# Patient Record
Sex: MALE | Race: WHITE | NOT HISPANIC OR LATINO | Employment: UNEMPLOYED | URBAN - NONMETROPOLITAN AREA
[De-identification: names, ages, dates, MRNs, and addresses within clinical notes are randomized per-mention and may not be internally consistent; named-entity substitution may affect disease eponyms.]

---
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
## 2020-01-15 ENCOUNTER — APPOINTMENT (OUTPATIENT)
Dept: GENERAL RADIOLOGY | Facility: HOSPITAL | Age: 20
End: 2020-01-15

## 2020-01-15 ENCOUNTER — HOSPITAL ENCOUNTER (EMERGENCY)
Facility: HOSPITAL | Age: 20
Discharge: HOME | End: 2020-01-15
Attending: EMERGENCY MEDICINE | Admitting: EMERGENCY MEDICINE

## 2020-01-15 VITALS
HEART RATE: 92 BPM | RESPIRATION RATE: 24 BRPM | TEMPERATURE: 98.9 F | OXYGEN SATURATION: 95 % | SYSTOLIC BLOOD PRESSURE: 135 MMHG | WEIGHT: 315 LBS | DIASTOLIC BLOOD PRESSURE: 72 MMHG | BODY MASS INDEX: 47.74 KG/M2 | HEIGHT: 68 IN

## 2020-01-15 DIAGNOSIS — K20.90 ESOPHAGITIS: ICD-10-CM

## 2020-01-15 DIAGNOSIS — J02.9 PHARYNGITIS, UNSPECIFIED ETIOLOGY: Primary | ICD-10-CM

## 2020-01-15 LAB — S PYO AG THROAT QL: NEGATIVE

## 2020-01-15 PROCEDURE — 99283 EMERGENCY DEPT VISIT LOW MDM: CPT

## 2020-01-15 PROCEDURE — 87081 CULTURE SCREEN ONLY: CPT | Performed by: EMERGENCY MEDICINE

## 2020-01-15 PROCEDURE — 71045 X-RAY EXAM CHEST 1 VIEW: CPT

## 2020-01-15 PROCEDURE — 87880 STREP A ASSAY W/OPTIC: CPT | Performed by: EMERGENCY MEDICINE

## 2020-01-15 RX ORDER — ALUMINA, MAGNESIA, AND SIMETHICONE 2400; 2400; 240 MG/30ML; MG/30ML; MG/30ML
15 SUSPENSION ORAL ONCE
Status: COMPLETED | OUTPATIENT
Start: 2020-01-15 | End: 2020-01-15

## 2020-01-15 RX ORDER — RANITIDINE 150 MG/1
150 TABLET ORAL 2 TIMES DAILY
Qty: 60 TABLET | Refills: 0 | Status: SHIPPED | OUTPATIENT
Start: 2020-01-15 | End: 2020-02-03

## 2020-01-15 RX ORDER — LIDOCAINE HYDROCHLORIDE 20 MG/ML
15 SOLUTION OROPHARYNGEAL ONCE
Status: COMPLETED | OUTPATIENT
Start: 2020-01-15 | End: 2020-01-15

## 2020-01-15 RX ADMIN — LIDOCAINE HYDROCHLORIDE 15 ML: 20 SOLUTION ORAL; TOPICAL at 13:33

## 2020-01-15 RX ADMIN — ALUMINUM HYDROXIDE, MAGNESIUM HYDROXIDE, AND DIMETHICONE 15 ML: 400; 400; 40 SUSPENSION ORAL at 13:34

## 2020-01-15 NOTE — ED PROVIDER NOTES
"Subjective   Abhishek Jha is a 19 year old male complaining of a sore throat. The patient reports that for the past week, he has been experiencing a pain in his throat that worsens with swallowing. He states that he has been eating less than normal for the past three days as a result and that he has not had a bowel movement in a several days. However, he notes that his constipation began prior to his decreased food intake and he is still passing gas and denies abdominal pain. He also reports that he has been experiencing a sharp pain in the center of his chest which worsens with a deep breathing and eating / drinking. He has had a mild headache as well. He presently denies any SOB, dizziness, cough, abdominal pain, rhinorrhea, or fever. He denies any known exposure to illness. He states that he has been able to drink fluids and swallow Ibuprofen and that this does seem to relieve some of his pain. The patient denies a medical history of GERD but does smoke cigarettes. He admits to \"eating too much at a family reunion\" prior to onset of symptoms. There are no other acute complaints at this time.      History provided by:  Patient  Sore Throat   Location:  Generalized  Severity:  Moderate  Duration:  1 week  Timing:  Constant  Progression:  Unchanged  Chronicity:  New  Relieved by: Cold liquids and Ibuprofen.  Worsened by:  Eating and swallowing  Associated symptoms: chest pain and headaches    Associated symptoms: no abdominal pain, no adenopathy, no chills, no cough, no drooling, no ear pain, no fever, no neck stiffness, no rash, no rhinorrhea, no shortness of breath, no trouble swallowing and no voice change    Risk factors: no exposure to strep, no exposure to mono and no sick contacts        Review of Systems   Constitutional: Negative for chills and fever.   HENT: Positive for sore throat. Negative for congestion, dental problem, drooling, ear pain, facial swelling, mouth sores, rhinorrhea, trouble swallowing " and voice change.    Respiratory: Negative for cough and shortness of breath.    Cardiovascular: Positive for chest pain.   Gastrointestinal: Positive for constipation. Negative for abdominal pain.   Musculoskeletal: Negative for neck pain and neck stiffness.   Skin: Negative for rash.   Neurological: Positive for headaches. Negative for dizziness and weakness.   Hematological: Negative for adenopathy.   Psychiatric/Behavioral: Negative for confusion.   All other systems reviewed and are negative.      History reviewed. No pertinent past medical history.    Allergies   Allergen Reactions   • Penicillins        History reviewed. No pertinent surgical history.    History reviewed. No pertinent family history.    Social History     Socioeconomic History   • Marital status: Single     Spouse name: Not on file   • Number of children: Not on file   • Years of education: Not on file   • Highest education level: Not on file   Tobacco Use   • Smoking status: Never Smoker   Substance and Sexual Activity   • Alcohol use: Never     Frequency: Never   • Drug use: Never   • Sexual activity: Defer         Objective   Physical Exam   Constitutional: He is oriented to person, place, and time. He appears well-developed and well-nourished.   HENT:   Head: Normocephalic and atraumatic.   Nose: Nose normal.   Mouth/Throat: Posterior oropharyngeal erythema (Mild.) present. No oropharyngeal exudate or posterior oropharyngeal edema.   Airway intact.   Eyes: Conjunctivae are normal. No scleral icterus.   Neck: Normal range of motion. Neck supple.   Cardiovascular: Normal rate and regular rhythm.   No murmur heard.  Pulmonary/Chest: Effort normal and breath sounds normal. No respiratory distress. He exhibits tenderness (mild inferior mid sternal).   Lung exam normal.   Abdominal: Soft. There is no tenderness.   Musculoskeletal: Normal range of motion.   Neurological: He is alert and oriented to person, place, and time.   Skin: Skin is warm  "and dry.   Psychiatric: He has a normal mood and affect. His behavior is normal.   Nursing note and vitals reviewed.      Procedures         ED Course      Mild relief of pain with GI cocktail. Discussed results and tx plan. Strep negative. Possible esophagitis and or viral process. Vitals stable, negative strep and CXR WNL. Discussed dc on antiacid and using chloraseptic spray and eating a bland diet.     Recent Results (from the past 24 hour(s))   Rapid Strep A Screen - Swab, Throat    Collection Time: 01/15/20  1:32 PM   Result Value Ref Range    Strep A Ag Negative Negative     Note: In addition to lab results from this visit, the labs listed above may include labs taken at another facility or during a different encounter within the last 24 hours. Please correlate lab times with ED admission and discharge times for further clarification of the services performed during this visit.    XR Chest 1 View   Final Result   No active disease.       D:  01/15/2020   E:  01/15/2020       This report was finalized on 1/15/2020 2:13 PM by Dr. Jer Carmichael MD.            Vitals:    01/15/20 1252 01/15/20 1257 01/15/20 1400   BP:  142/78 135/72   BP Location:  Left arm    Patient Position:  Sitting    Pulse:  92    Resp:  24    Temp: 98.9 °F (37.2 °C)     SpO2:  97% 95%   Weight:  (!) 156 kg (344 lb)    Height:  172.7 cm (68\")      Medications   aluminum-magnesium hydroxide-simethicone (MAALOX MAX) 400-400-40 MG/5ML suspension 15 mL (15 mL Oral Given 1/15/20 1334)   Lidocaine Viscous HCl (XYLOCAINE) 2 % mouth solution 15 mL (15 mL Mouth/Throat Given 1/15/20 1333)     ECG/EMG Results (last 24 hours)     ** No results found for the last 24 hours. **        No orders to display                     Hiro Coma Scale Score: 15                            MDM    Final diagnoses:   Pharyngitis, unspecified etiology   Esophagitis       Documentation assistance provided by epifanio Villarreal.  Information recorded by the epifanio " was done at my direction and has been verified and validated by me.     Stella Villarreal  01/15/20 1418       Stella Holcomb PA  01/15/20 1946

## 2020-01-17 LAB — BACTERIA SPEC AEROBE CULT: NORMAL

## 2020-02-03 ENCOUNTER — OFFICE VISIT (OUTPATIENT)
Dept: FAMILY MEDICINE CLINIC | Facility: CLINIC | Age: 20
End: 2020-02-03

## 2020-02-03 VITALS
HEART RATE: 84 BPM | DIASTOLIC BLOOD PRESSURE: 80 MMHG | SYSTOLIC BLOOD PRESSURE: 120 MMHG | HEIGHT: 68 IN | TEMPERATURE: 97.6 F | WEIGHT: 312 LBS | OXYGEN SATURATION: 98 % | BODY MASS INDEX: 47.29 KG/M2

## 2020-02-03 DIAGNOSIS — Z11.3 SCREENING EXAMINATION FOR STD (SEXUALLY TRANSMITTED DISEASE): ICD-10-CM

## 2020-02-03 DIAGNOSIS — Z86.39 HISTORY OF HYPOTHYROIDISM: ICD-10-CM

## 2020-02-03 DIAGNOSIS — E78.5 HYPERLIPIDEMIA, UNSPECIFIED HYPERLIPIDEMIA TYPE: Primary | ICD-10-CM

## 2020-02-03 DIAGNOSIS — E66.01 CLASS 3 SEVERE OBESITY DUE TO EXCESS CALORIES WITHOUT SERIOUS COMORBIDITY WITH BODY MASS INDEX (BMI) OF 45.0 TO 49.9 IN ADULT (HCC): ICD-10-CM

## 2020-02-03 PROCEDURE — 99385 PREV VISIT NEW AGE 18-39: CPT | Performed by: INTERNAL MEDICINE

## 2020-02-04 NOTE — PROGRESS NOTES
Chief Complaint   Patient presents with   • Establish Care       HPI:  Abhishek Jha is a 19 y.o. male who presents today for establish care and annual physical.  He has no acute complaints or signs today.    ROS:  Constitutional: no fevers, night sweats or unexplained weight loss  Eyes: no vision changes  ENT: no runny nose, ear pain, sore throat  Cardio: no chest pain, palpitations  Pulm: no shortness of breath, wheezing, or cough  GI: no abdominal pain or changes in bowel movements  : no difficulty urinating  MSK: no difficulty ambulating, no joint pain  Neuro: no weakness, dizziness or headache  Psych: no trouble sleeping  Endo: no change in appetite      History reviewed. No pertinent past medical history.   History reviewed. No pertinent family history.   Social History     Socioeconomic History   • Marital status: Single     Spouse name: Not on file   • Number of children: Not on file   • Years of education: Not on file   • Highest education level: Not on file   Tobacco Use   • Smoking status: Never Smoker   Substance and Sexual Activity   • Alcohol use: Never     Frequency: Never   • Drug use: Never   • Sexual activity: Defer      Allergies   Allergen Reactions   • Penicillins Anaphylaxis     Throat swelling        There is no immunization history on file for this patient.     PE:  Vitals:    02/03/20 1018   BP: 120/80   Pulse: 84   Temp: 97.6 °F (36.4 °C)   SpO2: 98%      Body mass index is 47.44 kg/m².    Gen Appearance: NAD  HEENT: Normocephalic, PERRLA, no thyromegaly, trache midline  Heart: RRR, normal S1 and S2, no murmur  Lungs: CTA b/l, no wheezing, no crackles  Abdomen: Soft, non-tender, non-distended, no guarding and BSx4  MSK: Moves all extremities well, normal gait, no peripheral edema  Pulses: Palpable and equal b/l  Lymph nodes: No palpable lymphadenopathy   Neuro: No focal deficits      No current outpatient medications on file.     No current facility-administered medications for this  visit.         Abhishek was seen today for establish care.    Diagnoses and all orders for this visit:  Preventative health  Counseled on healthy weight, nutrition, physical therapy, chemistries, immunizations.  Nutrition discussed with patient.  Will be establishing care with nutritionist.  Checking screening blood work.  Hyperlipidemia, unspecified hyperlipidemia type  -     Lipid Panel; Future  -     Ambulatory Referral to Nutrition Services    Screening examination for STD (sexually transmitted disease)  -     Chlamydia trachomatis, Neisseria gonorrhoeae, PCR - Urine, Urine, Clean Catch; Future  -     Hepatitis C Antibody; Future  -     RPR; Future  -     HIV-1 / O / 2 Ag / Antibody 4th Generation; Future    Class 3 severe obesity due to excess calories without serious comorbidity with body mass index (BMI) of 45.0 to 49.9 in adult (CMS/[facility])  -     Ambulatory Referral to Nutrition Services    History of hypothyroidism  -     TSH+Free T4; Future         Return in about 1 year (around 2/3/2021) for Annual.     Please note that portions of this document were completed with a voice recognition program. Efforts were made to edit the dictations, but occasionally words are mis-transcribed.

## 2020-02-11 ENCOUNTER — APPOINTMENT (OUTPATIENT)
Dept: NUTRITION | Facility: HOSPITAL | Age: 20
End: 2020-02-11

## 2021-04-26 ENCOUNTER — OFFICE VISIT (OUTPATIENT)
Dept: FAMILY MEDICINE CLINIC | Facility: CLINIC | Age: 21
End: 2021-04-26

## 2021-04-26 ENCOUNTER — TELEPHONE (OUTPATIENT)
Dept: FAMILY MEDICINE CLINIC | Facility: CLINIC | Age: 21
End: 2021-04-26

## 2021-04-26 VITALS
SYSTOLIC BLOOD PRESSURE: 120 MMHG | DIASTOLIC BLOOD PRESSURE: 66 MMHG | WEIGHT: 315 LBS | HEIGHT: 68 IN | OXYGEN SATURATION: 98 % | HEART RATE: 88 BPM | BODY MASS INDEX: 47.74 KG/M2

## 2021-04-26 DIAGNOSIS — Z11.3 SCREENING EXAMINATION FOR STD (SEXUALLY TRANSMITTED DISEASE): ICD-10-CM

## 2021-04-26 DIAGNOSIS — Z00.00 PREVENTATIVE HEALTH CARE: Primary | ICD-10-CM

## 2021-04-26 PROCEDURE — 99395 PREV VISIT EST AGE 18-39: CPT | Performed by: INTERNAL MEDICINE

## 2021-04-26 NOTE — TELEPHONE ENCOUNTER
Caller: COBY     Relationship:  WITH [employer]    Best call back number: [phone]    What form or medical record are you requesting: PAPERWORK LEFT BY THE PATIENT THIS MORNING     Who is requesting this form or medical record from you: COBY     How would you like to receive the form or medical records (pick-up, mail, fax): FAX  If fax, what is the fax number: [phone]  If mail, what is the address:   If pick-up, provide patient with address and location details    Timeframe paperwork needed: ASAP     Additional notes:COBY WITH [employer] CALLED AN STATED THAT THE PATIENT LEFT PAPERWORK WITH DR. MCALLISTER AND WAS GIVEN THE INCORRECT FAX NUMBER.        UPDATED FAX NUMBER.

## 2021-04-26 NOTE — TELEPHONE ENCOUNTER
Attempted to contact patient to confirm fax, no answer. Unable to leave voicemail,box not set up.

## 2021-04-26 NOTE — PROGRESS NOTES
Chief Complaint   Patient presents with   • Annual Exam       HPI:  Abhishek Jha is a 20 y.o. male who presents today for annual physical.  No acute concerns today.    ROS:  Constitutional: no fevers, night sweats or unexplained weight loss  Eyes: no vision changes  ENT: no runny nose, ear pain, sore throat  Cardio: no chest pain, palpitations  Pulm: no shortness of breath, wheezing, or cough  GI: no abdominal pain or changes in bowel movements  : no difficulty urinating  MSK: no difficulty ambulating, no joint pain  Neuro: no weakness, dizziness or headache  Psych: no trouble sleeping  Endo: no change in appetite      History reviewed. No pertinent past medical history.   History reviewed. No pertinent family history.   Social History     Socioeconomic History   • Marital status: Single     Spouse name: Not on file   • Number of children: Not on file   • Years of education: Not on file   • Highest education level: Not on file   Tobacco Use   • Smoking status: Never Smoker   Substance and Sexual Activity   • Alcohol use: Never   • Drug use: Never   • Sexual activity: Defer      Allergies   Allergen Reactions   • Penicillins Anaphylaxis     Throat swelling        There is no immunization history on file for this patient.     PE:  Vitals:    04/26/21 0912   BP: 120/66   Pulse: 88   SpO2: 98%      Body mass index is 48.81 kg/m².    Gen Appearance: NAD  HEENT: Normocephalic, PERRLA, no thyromegaly, trache midline  Heart: RRR, normal S1 and S2, no murmur  Lungs: CTA b/l, no wheezing, no crackles  Abdomen: Soft, non-tender, non-distended, no guarding and BSx4  MSK: Moves all extremities well, normal gait, no peripheral edema  Pulses: Palpable and equal b/l  Lymph nodes: No palpable lymphadenopathy   Neuro: No focal deficits      No current outpatient medications on file.     No current facility-administered medications for this visit.        Diagnoses and all orders for this visit:    1. Preventative health care  (Primary)  -     Chlamydia trachomatis, Neisseria gonorrhoeae, PCR - Urine, Urine, Clean Catch; Future  -     CBC & Differential; Future  -     Comprehensive Metabolic Panel; Future  -     Hemoglobin A1c; Future  -     Lipid Panel; Future  -     TSH+Free T4; Future  -     Urinalysis With Culture If Indicated - Urine, Clean Catch; Future  -     Vitamin D 25 Hydroxy; Future  Counseled on healthy weight, nutrition, physical activity, cancer screening, and immunizations.    2. Screening examination for STD (sexually transmitted disease)  -     Chlamydia trachomatis, Neisseria gonorrhoeae, PCR - Urine, Urine, Clean Catch; Future  -     HIV-1 / O / 2 Ag / Antibody 4th Generation; Future  -     Hepatitis C Antibody; Future  -     RPR; Future  3. Obesity  Counseled and encouraged weight loss       No follow-ups on file.     Please note that portions of this document were completed with a voice recognition program. Efforts were made to edit the dictations, but occasionally words are mis-transcribed.

## 2021-04-26 NOTE — TELEPHONE ENCOUNTER
LVM for Erich Macario to clarify fax number one more time.     Hub may relay message and document.

## 2021-04-29 NOTE — TELEPHONE ENCOUNTER
Erich croft/Nay called back to confirm fax and to have pprwrk re-faxed to her.  Pls fax today if possible.  Pls fax with the following title:     Amirah moreno [facility] ATTN: Erich    Fax: [phone]

## 2021-05-03 DIAGNOSIS — Z00.00 PREVENTATIVE HEALTH CARE: ICD-10-CM

## 2021-05-03 DIAGNOSIS — Z11.3 SCREENING EXAMINATION FOR STD (SEXUALLY TRANSMITTED DISEASE): ICD-10-CM
